# Patient Record
Sex: FEMALE | Race: BLACK OR AFRICAN AMERICAN | URBAN - METROPOLITAN AREA
[De-identification: names, ages, dates, MRNs, and addresses within clinical notes are randomized per-mention and may not be internally consistent; named-entity substitution may affect disease eponyms.]

---

## 2023-04-06 ENCOUNTER — OFFICE VISIT (OUTPATIENT)
Dept: HEMATOLOGY | Age: 48
End: 2023-04-06

## 2023-04-06 PROBLEM — I10 HIGH BLOOD PRESSURE: Status: ACTIVE | Noted: 2023-04-06

## 2023-04-06 PROBLEM — E78.5 HYPERLIPIDEMIA: Status: ACTIVE | Noted: 2023-04-06

## 2023-04-06 PROBLEM — E11.9 DIABETES MELLITUS TYPE 2, CONTROLLED (HCC): Status: ACTIVE | Noted: 2023-04-06

## 2023-04-06 PROBLEM — Z79.4 INSULIN LONG-TERM USE (HCC): Status: ACTIVE | Noted: 2023-04-06

## 2023-04-06 PROBLEM — F41.1 GENERALIZED ANXIETY DISORDER: Status: ACTIVE | Noted: 2023-04-06

## 2023-04-06 PROBLEM — R74.8 ELEVATED LIVER ENZYMES: Status: ACTIVE | Noted: 2023-04-06

## 2023-04-06 PROBLEM — K21.9 GERD (GASTROESOPHAGEAL REFLUX DISEASE): Status: ACTIVE | Noted: 2023-04-06

## 2023-04-06 NOTE — PROGRESS NOTES
Identified pt with two pt identifiers(name and ). Reviewed record in preparation for visit and have obtained necessary documentation. Chief Complaint   Patient presents with    New Patient     Establish care      Vitals:    23 1026   BP: 96/70   Pulse: 74   Resp: 17   Temp: 97.3 °F (36.3 °C)   TempSrc: Temporal   SpO2: 99%   Weight: 165 lb (74.8 kg)   Height: 5' 1\" (1.549 m)   PainSc:   3   PainLoc: Flank       Health Maintenance Review: Patient reminded of \"due or due soon\" health maintenance. I have asked the patient to contact his/her primary care provider (PCP) for follow-up on his/her health maintenance. Coordination of Care Questionnaire:  :   1) Have you been to an emergency room, urgent care, or hospitalized since your last visit? If yes, where when, and reason for visit? no      2. Have seen or consulted any other health care provider since your last visit? If yes, where when, and reason for visit? NO      Patient is accompanied by self I have received verbal consent from Keren Enriquez to discuss any/all medical information while they are present in the room.

## 2023-04-06 NOTE — PATIENT INSTRUCTIONS
Biopsy will help establish diagnosis. Possible PBC or autoimmune hepatitis. What is primary biliary cholangitis? -- Primary biliary cholangitis (or \"PBC\") is a disease that damages the liver. The liver is a big organ in the upper right side of the belly (figure 1). PBC happens when the body's infection-fighting system, called the \"immune system,\" attacks the liver. It used to be called \"primary biliary cirrhosis. \"  PBC is a serious disease. It can scar the liver and make it stop working. This can cause death. Most people who get PBC are females, but males sometimes get it. What are the symptoms of primary biliary cholangitis? -- Some people with PBC have no symptoms. When symptoms do happen, they can include:  ? Feeling very tired - People who have PBC are often sleepy in the daytime, even if they slept at night. ?Itching - This can be worse at night, if a person wears tight or itchy clothes, or in hot weather. ?Skin changes - These can include:  Dry skin  Hives - These are puffy patches that happen where a person scratches or rubs the skin. Dark patches on the skin  Yellow color of the skin and whites of the eyes - This is called \"jaundice. \"  ? Pain in the joints  ? Dry eyes and mouth  ? Belly pain (on the upper right side of the belly)  ? Losing weight without trying  ? Memory and thinking problems  Is there a test for primary biliary cholangitis? -- Yes. The doctor or nurse will ask about your symptoms and do an exam. Tests include:  ? Blood tests - This test can show if PBC or another condition is causing the symptoms. ? Liver biopsy - For this test, a doctor puts a needle into your liver and takes out a small sample of tissue. Then, they look at it under a microscope. The sample helps doctors make sure PBC is the cause of symptoms. It also tells doctors how much damage there is. Some people have imaging tests, such as an X-ray or MRI. Imaging tests create pictures of the inside of your body.   How is primary biliary cholangitis treated? -- Doctors treat PBC with medicines to slow down the liver damage. Other treatments include:  ? Vitamins - Some people with PBC do not get enough of certain vitamins from food. If you are not getting enough vitamins, the doctor can tell you which vitamins to take and how much. Do not take any vitamins or medicines without talking to your doctor or nurse first.  ? Medicines to treat health problems caused by PBC, such as itching or anemia (having too few red blood cells). ?Liver transplant - If your PBC is severe, or the medicines to slow down liver damage do not work, you might need a new liver. During a transplant, a doctor does surgery to replace the damaged liver with a healthy one. After a transplant, PBC sometimes comes back again. You can take medicine to lower the chance of this happening. Ask your doctor if you might need a liver transplant for PBC. If you do, your doctor will do more tests to make sure you are healthy enough for this kind of surgery. Then your doctor will put you on a waiting list for a liver transplant. Liver transplants are given to people with the most damaged livers first.    Autoimmune Hepatitis  Background - Autoimmune hepatitis is a chronic, inflammatory disease of the liver that occurs predominantly in females and may present at any age. It is generally characterized by circulating autoantibodies and elevated serum globulin levels (table 2). The disease may start as acute hepatitis and may progress to chronic liver disease and cirrhosis. (See 'Introduction' above.)  ? Patterns of clinical presentation - Autoimmune hepatitis has a heterogeneous and fluctuating nature, leading to marked variability in its clinical manifestations. Its spectrum ranges from asymptomatic patients to those with considerable and sometimes debilitating symptoms, and even to those with acute liver failure. (See 'Patterns of clinical presentation' above.)  ? Diagnostic evaluation - Diagnosis is based upon characteristic serologic and histologic findings and exclusion of other forms of chronic liver disease. It can often be strongly suspected based upon clinical and laboratory features, and thus a liver biopsy is not always necessary in patients with typical findings on noninvasive testing (see 'Diagnostic evaluation' above): For adults with compatible clinical or laboratory features, we obtain serum antinuclear antibodies (VALERY), anti-smooth muscle antibodies (ASMA), anti-mitochondrial antibodies (AMA), anti-liver/kidney microsomal-1 antibodies (anti-LKM-1), and either an immunoglobulin G (IgG) or gamma globulin level. For patients who are negative for these autoantibodies, we obtain anti-soluble liver antigen/liver pancreas antibody (anti-SLA/LP), anti-actin antibodies, and atypical perinuclear anti-neutrophil cytoplasmic antibodies. We prefer to obtain a liver biopsy in all patients in whom autoimmune hepatitis is suspected because histologic assessment can confirm the diagnosis and help guide treatment. Histologic evaluation is particularly useful as part of the diagnostic evaluation for patients who have few or atypical findings, negative autoantibodies and/or normal IgG levels. The diagnostic evaluation for children with suspected autoimmune hepatitis is similar to the evaluation in adults, although we obtain magnetic resonance cholangiopancreatography in all children to exclude autoimmune sclerosing cholangitis. In addition, antibody titers of 1:20 or greater are regarded as positive in children. (See 'Children' above.)  ? Treatment - The initial treatment for autoimmune hepatitis typically includes a glucocorticoid, with or without azathioprine or 6-mercaptopurine (table 3 and algorithm 1). Induction therapy, subsequent therapy, and prognosis are discussed in detail separately. (See \"Autoimmune hepatitis: Treatment\". )

## 2023-04-06 NOTE — PROGRESS NOTES
[address] [facility], [place], [address], [place], [place]      Lorenda Gaucher, PA-C April S Kwabena, PCNP-BC   Avi Araujo, [facility]-AG   Adriana Del Valle, FNP-CARLOS Allen, FNP-C   Gui Shannon, AGPCNP-BC      [address]   at [address], [address]   [phone]   FAX: [phone]   at [address], [address] [place] [address], [address] [place] [address] Street [address]   [phone]   FAX: [phone]           Patient Care Team:  Sula Hammans, NP as PCP - General (Nurse Practitioner)    Problem List  Date Reviewed: 4/6/2023            Codes Class Noted    GERD (gastroesophageal reflux disease) ICD-10-CM: K21.9  ICD-9-CM: 530.81  4/6/2023        High blood pressure ICD-10-CM: I10  ICD-9-CM: 401.9  4/6/2023        Generalized anxiety disorder ICD-10-CM: F41.1  ICD-9-CM: 300.02  4/6/2023        Hyperlipidemia ICD-10-CM: E78.5  ICD-9-CM: 272.4  4/6/2023        Insulin long-term use ([facility] ICD-10-CM: Z79.4  ICD-9-CM: V58.67  4/6/2023        Diabetes mellitus type 2, controlled ([facility] ICD-10-CM: E11.9  ICD-9-CM: 250.00  4/6/2023        Elevated liver enzymes ICD-10-CM: R74.8  ICD-9-CM: 790.5  4/6/2023         Heme negative  US? Several years ago, fatty liver  Beginning of March 3/2023 saw endocrine, labs elevated  Dr. Kourtney Hadley [facility]  Fatigue  [place]  Itching    The clinicians listed above have asked me to see Keren Enriquez in consultation regarding elevated liver enzymes and its management. All medical records sent by the referring physicians were reviewed including imaging studies. The patient is a 51 y.o. black female who was found to have elevated  liver enzymes around 2021. She had labs drawn with endocrinology and liver enzymes were noted to be markedly elevated in 3/2023. Serologic evaluation for markers of chronic liver disease was positive for VALERY, ASMA, AMA, and in   an elevation in ferritin, and FE saturation. Imaging of the liver was performed in 3/2023. These records have been requested    The patient had not started any new medications within 3 months preceding the elevation in liver chemistries. In the office today the patient has the following symptoms:  The patient feels well and has no complaints. fatigue,   nausea,  itching,  pain in the right side over the liver,   R-sided flank pain. The patient is not experiencing the following symptoms which are commonly seen with this liver disorder:   arthralgias,   myalgias,   yellowing of the eyes or skin. The patient completes all daily activities without any functional limitations. ASSESSMENT AND PLAN:  Elevated liver enzymes  Persistent elevation inliver transaminases and alkaline phosphatase of unclear etiology at this time. Will perform   Have performed   laboratory testing to monitor liver function and degree of liver injury. This included   BMP,   hepatic panel,   CBC with platelet count,   INR. Liver transaminases are   normal.    elevated. AST is   normal.    elevated. ALT is   normal.    elevated. ALP is   normal.    elevated. Liver function is   normal.    depressed. Total bilirubin is   normal.    elevated. Serum albumin is   normal.    depressed. INR is   normal.    prolonged. The platelet count is   normal.    depressed. Based upon laboratory studies   Fibroscan,   Elastography,   and imaging    the patient   does not appear to have   appears to have   may have   significant liver injury. advanced liver disease. cirrhosis. Serologic testing for causes of chronic liver disease   was ordered.     was negative for     was positive for   HCV  HBV   VALERY   ASMA  AMA  Alpha-1-antitrypsin  Ceruloplasmin  an elevation in   Ferritin  FE saturation  Will perform additional serologic tests to screen for other causes of chronic liver disease. The most likely causes for the liver chemistry abnormalities were discussed with the patient and include   viral hepatitis,   fatty liver disease,   alcoholic liver disease. immune liver disorders,   genetic metabolic liver disorders,   medications,   a non-specific non-hepatitis virus. The need to perform an assessment of liver fibrosis was discussed with the patient. The Fibroscan can assess liver fibrosis and determine if a patient has advanced fibrosis or cirrhosis without the need for liver biopsy. This will be performed at the next office visit. If the Fibroscan suggests advanced fibrosis then a liver biopsy should be considered. The Fibroscan can be repeated annually or as often as clinically indicated to assess for fibrosis progression and/or regression. If the   Since the  Fibroscan suggests there is none or minimal liver injury the patient will be monitored at 6 month intervals. If the liver enzymes remain normal and the liver stiffness by Fibroscan remains normal or near normal over the next 1-2 years than no further monitoring is needed. If the liver enzymes remain   intermittently elevated or become persistently elevated   persistently elevated   and/or the Fibroscan suggests that liver stiffness is increasing over the next 1-2 years than a liver biopsy should be considered. Will perform   Have performed   laboratory testing to monitor liver function and degree of liver injury. This included   BMP,   hepatic panel,   CBC with platelet count,   INR. Will perform imaging of the liver with   ultrasound. triple phase CT scan. dynamic MRI. MRI and MRCP because of persistent elevation in ALP and possible bile duct disease. The need to perform a liver biopsy to help determine the cause and severity of the liver test abnormalities was discussed.   The risks of performing the liver biopsy including pain, puncture of the lung, gallbladder, intestine or kidney and bleeding were discussed. The patient has decided to have a liver biopsy. This will be scheduled. Will defer liver biopsy for now. I have recommended that we proceed with liver biopsy but the patient has decided to defer this for now. There is no reason to perform liver biopsy at this time. Liver chemistries will be monitored. If the liver enzymes remain persistently elevated over the next 1-2 years a liver biopsy should be performed to ensure there is no ongoing chronic liver disease. The patient had a liver biopsy performed in ***. Will request that the liver biopsy slides sent be to me for my personal review. Screening for Hepatocellular Carcinoma  HCC screening   is not necessary if the patient has no evidence of cirrhosis. has not been not been performed   since ***/***.  was performed in ***/*** and   does not suggest [facility] [address] demonstrates   an elevation in AFP. a lesion on ultrasound. AFP was ordered today and ultrasound will be scheduled. Will repeat ultrasound in 6 months. Will perform   triple phase CT scan      dynamic MRI   to further characterize the lesion and help determine if this is HCC. Treatment of other medical problems in patients with chronic liver disease  There are no contraindications for the patient to take most medications that are necessary for treatment of other medical issues. The patient has cirrhrosis and should avoid taking NSAIDs which are associated with a higher rate of developing JENNIFER. The patient had HE and should avoid taking Benzodiazapines which could exacerbate HE. The patient can take   any medications utilized for treatment of DM  statins to treat hypercholesterolemia    The patient has alcohol induced liver disease but has been abstinent from alcohol for greater than 6 months.   Normal doses of acetaminophen, as recommended on the label of the bottle, are not hepatotoxic except in the setting of daily alcohol use, even in patients with cirrhosis and can be utilized for pain. The patient consumes alcohol on a daily basis or has recently stopped consuming alcohol. Regular alcohol use increases the risk of toxicity from acetaminophen. This analgesic should be avoided until the patient has been abstinent from alcohol for 6 months. Counseling for alcohol in patients with chronic liver disease  The patient was counseled regarding alcohol consumption and the effect of alcohol on chronic liver disease. The patient has cirrhosis and was advised to be abstinent from all alcohol including non-alcoholic beer which does contain some alcohol. The patient does not consume any significant amount of alcohol. The patient has not consumed alcohol since ***. The patient has continued to consume alcohol   daily. on rare social occasions. The patient was reminded that alcohol can cause fatty liver. Patients who have undergone obesity surgery are at much greater risk to develop alcohol induced liver injury. The patient does not have a chronic liver disease and does not have to be abstinent from alcohol. The patient consumes too much alcohol and is at risk to develop alcohol induced liver liver injury. It was recommended that all alcohol consumption be stopped and the patient be abstinent from alcohol for at least *** months. If the patient cannot stop consuming alcohol then there is an aclohol use disorder and the patient should consider entering alcohol counseling and/or attending AA. The patient has an alcohol abuse disorder and it was suggested that they enter alcohol counseling and/or attend AA. If the patient cannot stop drinking alcohol they cannot be considered a candidate for liver transplant.   The patient will need to attend alcohol counceling prior to being accepted as a liver transplant candidate. Vaccinations   Vaccination for viral hepatitis A and B is recommended since the patient has no serologic evidence of previous exposure or vaccination with immunity. Vaccination for viral hepatitis A and B is not needed. The patient has serologic evidence of prior exposure or vaccination with immunity. Vaccination for viral hepatitis A and B has been initiated. Vaccination for viral hepatitis A and B has been completed. Serologic studies will be performed to assess response to vaccine. The need for vaccination against viral hepatitis A and B will be assessed with serologic and instituted as appropriate. Since the patient does not have a chronic liver disease which can lead to liver injury screening for HAV and HBV is not needed. The patient has   not   received   2 doses   and the booster dose   of COVID-19 vaccine. The patient should receive a booster dose of COVID-19 vaccine in ***/***. The patient had COVID-19 infection and recovered. The patient does not want to take COVID-19 vaccine. The patient was encouraged to take the COVID-19 vaccine. Routine vaccinations against other bacterial and viral agents can be performed as indicated. Annual flu vaccination should be administered if indicated. ALLERGIES  Allergies   Allergen Reactions    Tetracycline Hives, Itching and Rash       MEDICATIONS  Current Outpatient Medications   Medication Sig    amLODIPine (NORVASC) 10 mg tablet Take 1 Tablet by mouth daily. busPIRone (BUSPAR) 5 mg tablet Take 1 Tablet by mouth two (2) times a day. Jardiance 25 mg tablet Take 1 Tablet by mouth daily.     ergocalciferol (ERGOCALCIFEROL) 1,250 mcg (50,000 unit) capsule TAKE 1 CAPSULE BY MOUTH ONCE A WEEK    lisinopriL (PRINIVIL, ZESTRIL) 10 mg tablet TAKE 3 TABLETS BY MOUTH ONCE DAILY    metFORMIN ER (GLUCOPHAGE XR) 500 mg tablet TAKE 2 TABLETS BY MOUTH TWICE DAILY WITH MEALS    pantoprazole (PROTONIX) 40 mg tablet Take 1 Tablet by mouth daily. naproxen (NAPROSYN) 375 mg tablet 1 Tablet as needed. rosuvastatin (CRESTOR) 10 mg tablet TAKE 1 TABLET BY MOUTH ONCE DAILY AT BEDTIME    Ozempic 1 mg/dose (4 mg/3 mL) pnij INJECT 1 SYRINGE SUBCUTANEOUSLY ONCE A WEEK    methocarbamoL (ROBAXIN) 500 mg tablet Take 1 Tablet by mouth as needed. venlafaxine-ER 24 HR (EFFEXOR-ER) 37.5 mg tr24 tablet Take 1 Tablet by mouth daily. No current facility-administered medications for this visit. SYSTEM REVIEW NOT RELATED TO LIVER DISEASE OR REVIEWED ABOVE:  Constitution systems: Negative for fever, chills, weight gain, weight loss. Eyes: Negative for visual changes. ENT: Negative for sore throat, painful swallowing. Respiratory: Negative for cough, hemoptysis, SOB. Cardiology: Negative for chest pain, palpitations. GI:  Negative for constipation or diarrhea. : Negative for urinary frequency, dysuria, hematuria, nocturia. Skin: Negative for rash. Hematology: Negative for easy bruising, blood clots. Musculo-skelatal: Negative for back pain, muscle pain, weakness. Neurologic: Negative for headaches, dizziness, vertigo, memory problems not related to HE. Psychology: Negative for anxiety, depression. FAMILY HISTORY:    The father  of abdominal cancer. The mother has DMII and well-managed HTN. There is no family history of liver disease. There is no family history of immune disorders. There is family history of sickle cell trait & disease. 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 Dad:  from cancer  Mom: DMII, HTN  Mom, brother and sister with type II DM    SOCIAL HISTORY:  The patient   is . has never been . is . is . is . The patient has  no children. 1 child. *** children,   *** stepchildren,   and   *** grandchildren. The patient   has never used tobacco products.     stopped using tobacco products in ***/***.    currently smokes *** pack of tobacco daily. currently smokes *** cigarettes daily. currently smokes *** cigars daily. currently chews tobacco.      The patient vapes. The patient   smokes   uses edible   Marijuana. uses CBD oil. The patient   has never consumed significant amounts of alcohol. consumes *** alcoholic beverages per   day   week. consumes alcohol in excess. consumes alcohol on social occasions never in excess. has previously consumed alcohol in excess. has previously consumed alcohol socially never in excess. The patient has been abstinent from alcohol since ***/***. The patient   currently works full time as ***.    currently works part time as ***.    used to work as ***.    does not work outside the home. The patient   retired in ***.    has not worked since ***.    is applying for disability. is currently receiving disability. PHYSICAL EXAMINATION:  Visit Vitals  BP 96/70 (BP 1 Location: Right upper arm, BP Patient Position: Sitting, BP Cuff Size: Adult)   Pulse 74   Temp 97.3 °F (36.3 °C) (Temporal)   Resp 17   Ht 5' 1\" (1.549 m)   Wt 165 lb (74.8 kg)   LMP  (LMP Unknown)   SpO2 99%   BMI 31.18 kg/m²     General: No acute distress. Eyes: Sclera anicteric. ENT: No oral lesions. Thyroid normal.  Nodes: No adenopathy. Skin: No spider angiomata. No jaundice. No palmar erythema. Respiratory: Lungs clear to auscultation. Cardiovascular: Regular heart rate. No murmurs. No JVD. Abdomen: Soft non-tender. Liver size normal to percussion/palpation. Spleen not palpable. No obvious ascites. Extremities: No edema. No muscle wasting. No gross arthritic changes. Neurologic: Alert and oriented. Cranial nerves grossly intact. No asterixis. LABORATORY STUDIES:  Recent liver function panel, CBC with platelet count and BMP are not available. These studies will be performed.     From 3/2023  AFP: 15.6 mg/mL    SEROLOGIES:  3/2023. HAV total negative, positive,   HBsAntigen negative,   anti-HBcore negative,   anti-HBsurface positive,   anti-HCV negative,   Ferritin 1045,   iron saturation 76%,   VALERY positive,  ASMA positive,  AMA positive,  ANCA negative, positive,  ceruloplasmin 22.3,   alpha-1-antitrypsin z & s allele not detected. LIVER HISTOLOGY:  Not available or performed    ENDOSCOPIC PROCEDURES:  Not available or performed    RADIOLOGY:  Not available or performed    OTHER TESTING:  Not available or performed    FOLLOW-UP:  All of the issues listed above in the Assessment and Plan were discussed with the patient. All questions were answered. The patient expressed a clear understanding of the above. [address] [address] in ***   weeks   months   for Fibroscan   for elastography   2 weeks after liver biopsy. which should be 1-2 weeks after the next imaging study. and to initiate HCV treatment. to assess for the effects of diet changes and weight loss. to assess the effects and tolerability of ***.  for screening and enrollment into a clinical trial for treatment of ***. The patient was given a follow-up appointment for *** months in case she decides not to enter or is excluded from entering the clinical trial.  for routine monitoring. to review all data and determine the treatment plan.

## 2023-05-19 ENCOUNTER — HOSPITAL ENCOUNTER (OUTPATIENT)
Facility: HOSPITAL | Age: 48
Setting detail: OUTPATIENT SURGERY
Discharge: HOME | End: 2023-05-19
Attending: INTERNAL MEDICINE | Admitting: INTERNAL MEDICINE
Payer: COMMERCIAL

## 2023-05-19 ENCOUNTER — HOSPITAL ENCOUNTER (OUTPATIENT)
Facility: HOSPITAL | Age: 48
End: 2023-05-19
Payer: COMMERCIAL

## 2023-05-19 VITALS
WEIGHT: 165 LBS | HEIGHT: 61 IN | RESPIRATION RATE: 19 BRPM | SYSTOLIC BLOOD PRESSURE: 105 MMHG | BODY MASS INDEX: 31.15 KG/M2 | HEART RATE: 67 BPM | DIASTOLIC BLOOD PRESSURE: 70 MMHG | OXYGEN SATURATION: 100 % | TEMPERATURE: 99.1 F

## 2023-05-19 DIAGNOSIS — R74.8 ACID PHOSPHATASE ELEVATED: ICD-10-CM

## 2023-05-19 PROCEDURE — 7100000010 HC PHASE II RECOVERY - FIRST 15 MIN: Performed by: INTERNAL MEDICINE

## 2023-05-19 PROCEDURE — 2709999900 HC NON-CHARGEABLE SUPPLY: Performed by: INTERNAL MEDICINE

## 2023-05-19 PROCEDURE — 47000 NEEDLE BIOPSY OF LIVER PERQ: CPT | Performed by: INTERNAL MEDICINE

## 2023-05-19 PROCEDURE — 7100000011 HC PHASE II RECOVERY - ADDTL 15 MIN: Performed by: INTERNAL MEDICINE

## 2023-05-19 PROCEDURE — 3600007502: Performed by: INTERNAL MEDICINE

## 2023-05-19 PROCEDURE — 88313 SPECIAL STAINS GROUP 2: CPT

## 2023-05-19 PROCEDURE — 88307 TISSUE EXAM BY PATHOLOGIST: CPT

## 2023-05-19 PROCEDURE — 76942 ECHO GUIDE FOR BIOPSY: CPT

## 2023-05-19 PROCEDURE — 76942 ECHO GUIDE FOR BIOPSY: CPT | Performed by: INTERNAL MEDICINE

## 2023-05-19 RX ORDER — ERGOCALCIFEROL 1.25 MG/1
50000 CAPSULE ORAL WEEKLY
COMMUNITY
Start: 2023-03-02

## 2023-05-19 RX ORDER — SEMAGLUTIDE 1.34 MG/ML
INJECTION, SOLUTION SUBCUTANEOUS
COMMUNITY
Start: 2023-03-02

## 2023-05-19 RX ORDER — SODIUM CHLORIDE 0.9 % (FLUSH) 0.9 %
5-40 SYRINGE (ML) INJECTION PRN
Status: DISCONTINUED | OUTPATIENT
Start: 2023-05-19 | End: 2023-05-19 | Stop reason: HOSPADM

## 2023-05-19 RX ORDER — ATORVASTATIN CALCIUM 20 MG/1
TABLET, FILM COATED ORAL
COMMUNITY

## 2023-05-19 RX ORDER — FENTANYL CITRATE 50 UG/ML
25 INJECTION, SOLUTION INTRAMUSCULAR; INTRAVENOUS AS NEEDED
Status: DISCONTINUED | OUTPATIENT
Start: 2023-05-19 | End: 2023-05-19 | Stop reason: HOSPADM

## 2023-05-19 RX ORDER — VENLAFAXINE HYDROCHLORIDE 37.5 MG/1
37.5 TABLET, EXTENDED RELEASE ORAL DAILY
COMMUNITY

## 2023-05-19 RX ORDER — ONDANSETRON 2 MG/ML
2 INJECTION INTRAMUSCULAR; INTRAVENOUS AS NEEDED
Status: DISCONTINUED | OUTPATIENT
Start: 2023-05-19 | End: 2023-05-19 | Stop reason: HOSPADM

## 2023-05-19 RX ORDER — SODIUM CHLORIDE 0.9 % (FLUSH) 0.9 %
5-40 SYRINGE (ML) INJECTION EVERY 12 HOURS SCHEDULED
Status: DISCONTINUED | OUTPATIENT
Start: 2023-05-19 | End: 2023-05-19 | Stop reason: HOSPADM

## 2023-05-19 RX ORDER — AMLODIPINE BESYLATE 10 MG/1
10 TABLET ORAL DAILY
COMMUNITY
Start: 2023-02-21

## 2023-05-19 RX ORDER — METFORMIN HYDROCHLORIDE 500 MG/1
1000 TABLET, EXTENDED RELEASE ORAL 2 TIMES DAILY WITH MEALS
COMMUNITY
Start: 2023-03-02

## 2023-05-19 RX ORDER — ERGOCALCIFEROL 1.25 MG/1
1 CAPSULE ORAL WEEKLY
COMMUNITY
Start: 2023-03-02

## 2023-05-19 RX ORDER — BUSPIRONE HYDROCHLORIDE 5 MG/1
5 TABLET ORAL 2 TIMES DAILY
COMMUNITY

## 2023-05-19 RX ORDER — TRAMADOL HYDROCHLORIDE 50 MG/1
TABLET ORAL
COMMUNITY
Start: 2023-04-05

## 2023-05-19 RX ORDER — LISINOPRIL 10 MG/1
TABLET ORAL
COMMUNITY
Start: 2023-03-27

## 2023-05-19 RX ORDER — PANTOPRAZOLE SODIUM 40 MG/1
40 TABLET, DELAYED RELEASE ORAL DAILY
COMMUNITY
Start: 2023-04-03

## 2023-05-19 RX ORDER — SODIUM CHLORIDE 9 MG/ML
25 INJECTION, SOLUTION INTRAVENOUS PRN
Status: DISCONTINUED | OUTPATIENT
Start: 2023-05-19 | End: 2023-05-19 | Stop reason: HOSPADM

## 2023-05-19 ASSESSMENT — PAIN SCALES - GENERAL
PAINLEVEL_OUTOF10: 2
PAINLEVEL_OUTOF10: 2

## 2023-05-19 ASSESSMENT — PAIN DESCRIPTION - ORIENTATION: ORIENTATION: RIGHT

## 2023-05-19 ASSESSMENT — PAIN DESCRIPTION - LOCATION: LOCATION: BACK

## 2023-05-19 NOTE — H&P
pantoprazole (PROTONIX) 40 MG tablet Take 1 tablet by mouth daily      venlafaxine 37.5 MG extended release tablet Take 1 tablet by mouth daily      traMADol (ULTRAM) 50 MG tablet TAKE 1 TABLET BY MOUTH THREE TIMES DAILY AS NEEDED FOR PAIN      OZEMPIC, 1 MG/DOSE, 4 MG/3ML SOPN INJECT 1 SYRINGE SUBCUTANEOUSLY ONCE A WEEK         For liver biopsy to assess Abnormal liver enzymes. The risks of the procedure were discussed with the patient. This included bleeding, pain, and puncture of other organs. All questions were answered. The patient wishes to proceed with the procedure. PHYSICAL EXAMINATION:  /62   Pulse 88   Temp 99.1 °F (37.3 °C) (Temporal)   Resp 16   Ht 5' 1\" (1.549 m)   Wt 165 lb (74.8 kg)   BMI 31.18 kg/m²       General: No acute distress. Eyes: Sclera anicteric. ENT: No oral lesions. Thyroid normal.  Nodes: No adenopathy. Skin: No spider angiomata. No jaundice. No palmar erythema. Respiratory: Lungs clear to auscultation. Cardiovascular: Regular heart rate. No murmurs. No JVD. Abdomen: Soft non-tender, liver size normal to percussion/palpation. Spleen not palpable. No obvious ascites. Extremities: No edema. No muscle wasting. No gross arthritic changes. Neurologic: Alert and oriented. Cranial nerves grossly intact. No asterixis. MOST RECENT LABS:  No results found for: WBC, HGB, PLT  No results found for: AST, ALT, ALKP1L, BILITOT, LABALBU  @RESUFAST(INR:3,PROTIME:3)      PRE-PROCEDURE DOCUMENTATION  The risks of the procedure were discussed with the patient. These included reaction to anesthesia, pain, perforation and bleeding. All questions were answered. The patient wishes to proceed with the procedure. ASA status  2  Airway assessment:  NA      ASSESSMENT AND PLAN:  Liver biopsy under ultrasound guidance.     MD Panfilo Malagon 13 of [address] [place] [address] Cris Rivera [address] [place] [address]

## 2023-05-19 NOTE — DISCHARGE INSTRUCTIONS
[facility], MD, FACP, [place], [place]      DAVID Bailey S Shree, PCNP-BC   Gabbi Moise, [facility]-   Marvin Reddy, FNP-C  Marcelina Sauer FNP-C   Shekhar Jefferson, AGPCNP-BC      [address]   at [facility]   [address] [facility], [address]   [phone]   FAX: [address]   at [facility]   [address], [address] [place] [address], [address] [place] [address] Street [address]   [phone]   FAX: [phone]         LIVER BIOPSY DISCHARGE INSTRUCTIONS      Rita Suarez  1975  Date: 5/19/2023    DIET:    Brandon Matias may resume your previous diet. ACTIVITIES:  Rest quietly the rest of today. You should not lift any objects more than 20 pounds for the next 2 days. If you work sitting down without strenuous activity you may return to work tomorrow. If you exert yourself or do heavy lifting at work you should take tomorrow off. Do not drive or operate hazardous machinery for 12 hours after you are discharged from this procedure. SPECIAL INSTRUCTIONS:  Do not use any aspirin or non-steroidal (Motin, Advil, Naproxen, etc) pain medications for the next 2 days. You may use extra-strength Tylenol (acetaminophen) if you experience pain or discomfort later today. Restarting blood thinners: If you were taking blood thinners prior to the procedure you can restart these in 2 days. Call the [address] office if you experience any of the following:  Persistent or severe abdominal pain. Persistent or severe abdominal distention. Fever and chills   Nausea and vomiting. New or unusual symptoms. Follow-up care: You should have a follow up appointment with Dr. Bonnie Barrera to review the results of the liver biopsy results in 2 weeks.   If you do not have an appointment please call the office

## 2023-05-19 NOTE — PERIOP NOTE
6807: Pt positioned for comfort with warm blanket. 6443: Warm blanket to right should and back,  Pt prefers not to have medication for pain. Pt states that she's comfortable.

## 2023-05-19 NOTE — OP NOTE
[facility], MD, FACP, [place], [place]      DAVID Mayo, [facility]   Ottoniel Robledo, [facility]   JERED Orosco FNP-C Delories Loron JENNIFFER-SHAQUILLE Dill [address]   at [address], [address] [facility] [address].   [phone]   FAX: [phone]   at [address], [address] [place] [address] - [address]   [phone]   FAX: [phone]         LIVER BIOPSY PROCEDURE NOTE    NAME:  Jenny Ramos  :  1975  MRN:  [id number]    INDICATIONS/PRE-OPERATIVE  DIAGNOSIS:  Elevated liver enzymes    : Julia Carpenter MD    SURGICAL ASSISTANT:  None    PROSTHETIC DEVICES, TISSUE GRAFTS, TRANSPLANTED ORGANS:  Not applicable    SEDATION: 1% Lidocaine injection 10 ml    PROCEDURE:  Informed consent to perform the procedure was obtained from the patient. The patient was positioned on the edge of the stretcher lying flat in the supine position. Ultrasound was utilized to image the liver. The diaphragm and any major mass lesion or vascular structures within the liver were identified. An appropriate site for liver biopsy was identified. The distance from the surface of the skin to the liver capsule was 3 cm. This area was prepped with betadine and draped in sterile fashion. The skin was infiltrated with 1% lidocaine. The deeper subcutanous tissues and liver capsule overlying the biopsy site were then infiltrated with 1% lidocaine until appropriate anesthesia was obtained. A small incision was made in the skin so the biopsy devise could be easily inserted. A total of 2 passes with the 16 gauge Bard biopsy devise was then made into the liver.   Core(s) of liver tissue totaling 3 cm in length were obtained and placed into tissue

## 2023-06-05 ENCOUNTER — TELEPHONE (OUTPATIENT)
Age: 48
End: 2023-06-05

## 2023-06-05 NOTE — TELEPHONE ENCOUNTER
Patient called stating she will be faxing FMLA paperwork and if any questions please give her a call      [email or web address] Rec'd FMLA paperwork. Office note will need to be completed first from 6/1/23. (DAVID)      [email or web address] Faxed FMLA back to [address] with last office note and path report. Patient aware paperwork will be faxed. (DAVID)

## 2023-06-06 ENCOUNTER — TELEPHONE (OUTPATIENT)
Age: 48
End: 2023-06-06

## 2023-06-06 NOTE — TELEPHONE ENCOUNTER
[email or web address]  if you can complete office note from 6/1/23. I will be able to complete FMLA paperwork. Thanks (DAVID)    [email or web address] Faxed [facility] paperwork to [address]. (DAVID)

## 2023-06-07 ENCOUNTER — TELEPHONE (OUTPATIENT)
Age: 48
End: 2023-06-07

## 2023-06-07 NOTE — TELEPHONE ENCOUNTER
Patient states she having swelling left side of face and aching towards her ear that started last night after taking prednisone and cellcept she thinks its a allergic reaction instructed patient to go to ER if it gets worse before hearing back from nurse      [email or web address] Spoke w/patient concerning swelling near the ear and pain near the ear. Discuss symptoms w/Brisa and she would like patient to be seen by PCP or urgent care facility to make sure it's not a ear infection. Cellcept and prednisone should not cause these symptoms and prednisone should help with swelling. Patient and I discussed how to take all medication and she verbalize understanding of seeing PCP for swelling/ear pain today. Advise patient to call our office if she need any more assistance.  (KF)

## (undated) DEVICE — MAX-CORE® DISPOSABLE CORE BIOPSY INSTRUMENT, 16G X 16CM: Brand: MAX-CORE

## (undated) DEVICE — TRAY BX SFT TISS W/ RUL ALC PREP PD FEN DRP TWL LUERLOCK